# Patient Record
Sex: FEMALE | URBAN - METROPOLITAN AREA
[De-identification: names, ages, dates, MRNs, and addresses within clinical notes are randomized per-mention and may not be internally consistent; named-entity substitution may affect disease eponyms.]

---

## 2020-03-29 ENCOUNTER — EMERGENCY (EMERGENCY)
Facility: HOSPITAL | Age: 25
LOS: 1 days | Discharge: HOME | End: 2020-03-29
Attending: PEDIATRICS | Admitting: PEDIATRICS
Payer: COMMERCIAL

## 2020-03-29 VITALS
RESPIRATION RATE: 16 BRPM | OXYGEN SATURATION: 100 % | HEART RATE: 88 BPM | SYSTOLIC BLOOD PRESSURE: 109 MMHG | TEMPERATURE: 99 F | DIASTOLIC BLOOD PRESSURE: 78 MMHG

## 2020-03-29 VITALS
SYSTOLIC BLOOD PRESSURE: 119 MMHG | HEART RATE: 107 BPM | TEMPERATURE: 99 F | DIASTOLIC BLOOD PRESSURE: 91 MMHG | RESPIRATION RATE: 17 BRPM | OXYGEN SATURATION: 100 %

## 2020-03-29 LAB
ALBUMIN SERPL ELPH-MCNC: 5 G/DL — SIGNIFICANT CHANGE UP (ref 3.3–5)
ALP SERPL-CCNC: 46 U/L — SIGNIFICANT CHANGE UP (ref 40–120)
ALT FLD-CCNC: 15 U/L — SIGNIFICANT CHANGE UP (ref 4–33)
ANION GAP SERPL CALC-SCNC: 13 MMO/L — SIGNIFICANT CHANGE UP (ref 7–14)
AST SERPL-CCNC: 15 U/L — SIGNIFICANT CHANGE UP (ref 4–32)
BASOPHILS # BLD AUTO: 0.02 K/UL — SIGNIFICANT CHANGE UP (ref 0–0.2)
BASOPHILS NFR BLD AUTO: 0.4 % — SIGNIFICANT CHANGE UP (ref 0–2)
BILIRUB SERPL-MCNC: 0.3 MG/DL — SIGNIFICANT CHANGE UP (ref 0.2–1.2)
BUN SERPL-MCNC: 9 MG/DL — SIGNIFICANT CHANGE UP (ref 7–23)
CALCIUM SERPL-MCNC: 10.1 MG/DL — SIGNIFICANT CHANGE UP (ref 8.4–10.5)
CHLORIDE SERPL-SCNC: 103 MMOL/L — SIGNIFICANT CHANGE UP (ref 98–107)
CO2 SERPL-SCNC: 25 MMOL/L — SIGNIFICANT CHANGE UP (ref 22–31)
CREAT SERPL-MCNC: 0.71 MG/DL — SIGNIFICANT CHANGE UP (ref 0.5–1.3)
EOSINOPHIL # BLD AUTO: 0.01 K/UL — SIGNIFICANT CHANGE UP (ref 0–0.5)
EOSINOPHIL NFR BLD AUTO: 0.2 % — SIGNIFICANT CHANGE UP (ref 0–6)
GLUCOSE SERPL-MCNC: 92 MG/DL — SIGNIFICANT CHANGE UP (ref 70–99)
HCT VFR BLD CALC: 44.1 % — SIGNIFICANT CHANGE UP (ref 34.5–45)
HGB BLD-MCNC: 14.4 G/DL — SIGNIFICANT CHANGE UP (ref 11.5–15.5)
IMM GRANULOCYTES NFR BLD AUTO: 0.2 % — SIGNIFICANT CHANGE UP (ref 0–1.5)
LYMPHOCYTES # BLD AUTO: 0.81 K/UL — LOW (ref 1–3.3)
LYMPHOCYTES # BLD AUTO: 17.5 % — SIGNIFICANT CHANGE UP (ref 13–44)
MCHC RBC-ENTMCNC: 29.6 PG — SIGNIFICANT CHANGE UP (ref 27–34)
MCHC RBC-ENTMCNC: 32.7 % — SIGNIFICANT CHANGE UP (ref 32–36)
MCV RBC AUTO: 90.6 FL — SIGNIFICANT CHANGE UP (ref 80–100)
MONOCYTES # BLD AUTO: 0.27 K/UL — SIGNIFICANT CHANGE UP (ref 0–0.9)
MONOCYTES NFR BLD AUTO: 5.8 % — SIGNIFICANT CHANGE UP (ref 2–14)
NEUTROPHILS # BLD AUTO: 3.51 K/UL — SIGNIFICANT CHANGE UP (ref 1.8–7.4)
NEUTROPHILS NFR BLD AUTO: 75.9 % — SIGNIFICANT CHANGE UP (ref 43–77)
NRBC # FLD: 0 K/UL — SIGNIFICANT CHANGE UP (ref 0–0)
PLATELET # BLD AUTO: 235 K/UL — SIGNIFICANT CHANGE UP (ref 150–400)
PMV BLD: 11.5 FL — SIGNIFICANT CHANGE UP (ref 7–13)
POTASSIUM SERPL-MCNC: 4.5 MMOL/L — SIGNIFICANT CHANGE UP (ref 3.5–5.3)
POTASSIUM SERPL-SCNC: 4.5 MMOL/L — SIGNIFICANT CHANGE UP (ref 3.5–5.3)
PROT SERPL-MCNC: 8.2 G/DL — SIGNIFICANT CHANGE UP (ref 6–8.3)
RBC # BLD: 4.87 M/UL — SIGNIFICANT CHANGE UP (ref 3.8–5.2)
RBC # FLD: 12.1 % — SIGNIFICANT CHANGE UP (ref 10.3–14.5)
SODIUM SERPL-SCNC: 141 MMOL/L — SIGNIFICANT CHANGE UP (ref 135–145)
WBC # BLD: 4.63 K/UL — SIGNIFICANT CHANGE UP (ref 3.8–10.5)
WBC # FLD AUTO: 4.63 K/UL — SIGNIFICANT CHANGE UP (ref 3.8–10.5)

## 2020-03-29 PROCEDURE — 99284 EMERGENCY DEPT VISIT MOD MDM: CPT

## 2020-03-29 RX ORDER — SODIUM CHLORIDE 9 MG/ML
1000 INJECTION INTRAMUSCULAR; INTRAVENOUS; SUBCUTANEOUS ONCE
Refills: 0 | Status: COMPLETED | OUTPATIENT
Start: 2020-03-29 | End: 2020-03-29

## 2020-03-29 RX ADMIN — SODIUM CHLORIDE 2000 MILLILITER(S): 9 INJECTION INTRAMUSCULAR; INTRAVENOUS; SUBCUTANEOUS at 11:46

## 2020-03-29 NOTE — ED PROVIDER NOTE - OBJECTIVE STATEMENT
Ashtyn is a previously healthy 23yo female here with c/o of weakness, fevers x5 days, shortness of breath.  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  Says she has had daily fevers tmax 101-102F, responds to tylenol, generalized malaise, loss of appetitie. NO cough but short of breath on exertion, also had 1x diarrhea today  Wanted to go to  for evaluation but says she "felt too weak to get up".    NO complaints at this time.    NO other PMHx, PSHx, meds, allergies reported

## 2020-03-29 NOTE — ED ADULT TRIAGE NOTE - CHIEF COMPLAINT QUOTE
pt ambulatory to triage,  c/o 3 days of fever and weakness. was due to have covid swab today at [facility], states she was weak so wanted to be seen in the ED.

## 2020-03-29 NOTE — ED PEDIATRIC NURSE REASSESSMENT NOTE - NS ED NURSE REASSESS COMMENT FT2
Patient resting in bed, Denies pain or difficulty breathing, IV placed with fluids going. Covid19 testing consent form was signed and swab walked to lab. Currently waiting for lab results to return. VSS, will continue to monitor.

## 2020-03-29 NOTE — ED PEDIATRIC NURSE NOTE - NS ED NURSE DC INFO COMPLEXITY
Straightforward: Basic instructions, no meds, no home treatment/Patient asked questions/Verbalized Understanding/Simple: Patient demonstrates quick and easy understanding

## 2020-03-29 NOTE — ED PEDIATRIC NURSE NOTE - OBJECTIVE STATEMENT
24 y-o Female with no PMHX presents to the ED c/o fever and malaise x5 days. 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 Denies cough, vomting diarrhea. Pt also complains of SOB on exertion. Highest temp 101.8. Last taken Tylenol last night. 24 y-o Female with no PMHX presents to the ED c/o fever and malaise x5 days. 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 Pt states she had an appointment for COVD 19 testing at urgent care today but felt that she was to weak to go and asked her mother to drive to [facility]. Denies cough, vomiting/diarrhea. Pt also complains of SOB on exertion. Highest temp 101.8. Last taken Tylenol last night. 24 y-o Female with no PMHX presents to the ED c/o fever and malaise x5 days. 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 Pt states she had an appointment for COVD 19 testing at urgent care today but felt that she was to weak to go and asked her mother to drive to [facility]. Denies cough, vomiting/diarrhea. Pt also complains of dyspnea on exertion. Highest temp 101.8. Last taken Tylenol last night.

## 2020-03-29 NOTE — ED PROVIDER NOTE - CLINICAL SUMMARY MEDICAL DECISION MAKING FREE TEXT BOX
Elmo Helton DO (PEM Attending): Pt healthy, with fevers, malaise mild SOB. heere with normal vitals, no signs of shcok, normal saturations and RR, clear lungs. 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   Also suspect mild dehydration  -IV, fluids, COVID testing, reassess. If remains stable from respiratory standpoint, d/c

## 2020-03-29 NOTE — ED PROVIDER NOTE - NSFOLLOWUPINSTRUCTIONS_ED_ALL_ED_FT
Your CBC and CMP are normal and your vital signs were reassuring.  You should receive a text of your COVID results within a week.    Viral Illness  Viruses are tiny germs that can get into a person's body and cause illness. There are many different types of viruses, and they cause many types of illness. Viral illness in children is very common. A viral illness can cause fever, sore throat, cough, rash, or diarrhea. Most viral illnesses that affect children are not serious. Most go away after several days without treatment.    The most common types of viruses that affect children are:    Cold and flu viruses.  Stomach viruses.  Viruses that cause fever and rash. These include illnesses such as measles, rubella, roseola, fifth disease, and chicken pox.    What are the causes?  Many types of viruses can cause illness. Viruses invade cells in your child's body, multiply, and cause the infected cells to malfunction or die. When the cell dies, it releases more of the virus. When this happens, your child develops symptoms of the illness, and the virus continues to spread to other cells. If the virus takes over the function of the cell, it can cause the cell to divide and grow out of control, as is the case when a virus causes cancer.    Different viruses get into the body in different ways. Your child is most likely to catch a virus from being exposed to another person who is infected with a virus. This may happen at home, at school, or at . Your child may get a virus by:    Breathing in droplets that have been coughed or sneezed into the air by an infected person. Cold and flu viruses, as well as viruses that cause fever and rash, are often spread through these droplets.  Touching anything that has been contaminated with the virus and then touching his or her nose, mouth, or eyes. Objects can be contaminated with a virus if:    They have droplets on them from a recent cough or sneeze of an infected person.  They have been in contact with the vomit or stool (feces) of an infected person. Stomach viruses can spread through vomit or stool.    Eating or drinking anything that has been in contact with the virus.  Being bitten by an insect or animal that carries the virus.  Being exposed to blood or fluids that contain the virus, either through an open cut or during a transfusion.    What are the signs or symptoms?  Symptoms vary depending on the type of virus and the location of the cells that it invades. Common symptoms of the main types of viral illnesses that affect children include:    Cold and flu viruses     Fever.  Sore throat.  Aches and headache.  Stuffy nose.  Earache.  Cough.  Stomach viruses     Fever.  Loss of appetite.  Vomiting.  Stomachache.  Diarrhea.  Fever and rash viruses     Fever.  Swollen glands.  Rash.  Runny nose.  How is this treated?  Most viral illnesses in children go away within 3?10 days. In most cases, treatment is not needed. Your child's health care provider may suggest over-the-counter medicines to relieve symptoms.    A viral illness cannot be treated with antibiotic medicines. Viruses live inside cells, and antibiotics do not get inside cells. Instead, antiviral medicines are sometimes used to treat viral illness, but these medicines are rarely needed in children.    Many childhood viral illnesses can be prevented with vaccinations (immunization shots). These shots help prevent flu and many of the fever and rash viruses.    Follow these instructions at home:  Medicines     Give over-the-counter and prescription medicines only as told by your child's health care provider. Cold and flu medicines are usually not needed. If your child has a fever, ask the health care provider what over-the-counter medicine to use and what amount (dosage) to give.  Do not give your child aspirin because of the association with Reye syndrome.  If your child is older than 4 years and has a cough or sore throat, ask the health care provider if you can give cough drops or a throat lozenge.  Do not ask for an antibiotic prescription if your child has been diagnosed with a viral illness. That will not make your child's illness go away faster. Also, frequently taking antibiotics when they are not needed can lead to antibiotic resistance. When this develops, the medicine no longer works against the bacteria that it normally fights.  Eating and drinking     Image   If your child is vomiting, give only sips of clear fluids. Offer sips of fluid frequently. Follow instructions from your child's health care provider about eating or drinking restrictions.  If your child is able to drink fluids, have the child drink enough fluid to keep his or her urine clear or pale yellow.  General instructions     Make sure your child gets a lot of rest.  If your child has a stuffy nose, ask your child's health care provider if you can use salt-water nose drops or spray.  If your child has a cough, use a cool-mist humidifier in your child's room.  If your child is older than 1 year and has a cough, ask your child's health care provider if you can give teaspoons of honey and how often.  Keep your child home and rested until symptoms have cleared up. Let your child return to normal activities as told by your child's health care provider.  Keep all follow-up visits as told by your child's health care provider. This is important.  How is this prevented?  ImageTo reduce your child's risk of viral illness:    Teach your child to wash his or her hands often with soap and water. If soap and water are not available, he or she should use hand .  Teach your child to avoid touching his or her nose, eyes, and mouth, especially if the child has not washed his or her hands recently.  If anyone in the household has a viral infection, clean all household surfaces that may have been in contact with the virus. Use soap and hot water. You may also use diluted bleach.  Keep your child away from people who are sick with symptoms of a viral infection.  Teach your child to not share items such as toothbrushes and water bottles with other people.  Keep all of your child's immunizations up to date.  Have your child eat a healthy diet and get plenty of rest.    Contact a health care provider if:  Your child has symptoms of a viral illness for longer than expected. Ask your child's health care provider how long symptoms should last.  Treatment at home is not controlling your child's symptoms or they are getting worse.  Get help right away if:  Your child who is younger than 3 months has a temperature of 100°F (38°C) or higher.  Your child has vomiting that lasts more than 24 hours.  Your child has trouble breathing.  Your child has a severe headache or has a stiff neck.  This information is not intended to replace advice given to you by your health care provider. Make sure you discuss any questions you have with your health care provider.

## 2020-03-29 NOTE — ED PROVIDER NOTE - PATIENT PORTAL LINK FT
You can access the FollowMyHealth Patient Portal offered by [facility] by registering at the following website: [email or web address]. By joining [facility]’s FollowMyHealth portal, you will also be able to view your health information using other applications (apps) compatible with our system.

## 2020-03-29 NOTE — ED PEDIATRIC NURSE NOTE - CAS ELECT INFOMATION PROVIDED
Patient cleared for discharge as per MD. Signs and symptoms discussed for reasons to return. Parents comfortable with discharge plan. Vital signs as documented./DC instructions

## 2020-03-29 NOTE — ED PEDIATRIC TRIAGE NOTE - CHIEF COMPLAINT QUOTE
Patient here for fever and malaise x5days. Works at [employer], No PMHX, IUTD, no allergies. Patient here for fever and malaise x5days. Works at [employer], No PMHX, IUTD, no allergies.

## 2020-03-30 LAB — SARS-COV-2 RNA SPEC QL NAA+PROBE: DETECTED

## 2022-02-22 PROBLEM — Z78.9 OTHER SPECIFIED HEALTH STATUS: Chronic | Status: ACTIVE | Noted: 2020-03-29

## 2022-03-15 ENCOUNTER — APPOINTMENT (OUTPATIENT)
Dept: OBGYN | Facility: CLINIC | Age: 27
End: 2022-03-15

## 2022-04-01 PROBLEM — Z00.00 ENCOUNTER FOR PREVENTIVE HEALTH EXAMINATION: Status: ACTIVE | Noted: 2022-04-01

## 2022-04-11 ENCOUNTER — APPOINTMENT (OUTPATIENT)
Dept: OBGYN | Facility: CLINIC | Age: 27
End: 2022-04-11
Payer: COMMERCIAL

## 2022-04-11 VITALS
WEIGHT: 128 LBS | HEIGHT: 67 IN | DIASTOLIC BLOOD PRESSURE: 84 MMHG | SYSTOLIC BLOOD PRESSURE: 126 MMHG | BODY MASS INDEX: 20.09 KG/M2

## 2022-04-11 DIAGNOSIS — Z82.49 FAMILY HISTORY OF ISCHEMIC HEART DISEASE AND OTHER DISEASES OF THE CIRCULATORY SYSTEM: ICD-10-CM

## 2022-04-11 DIAGNOSIS — Z83.42 FAMILY HISTORY OF FAMILIAL HYPERCHOLESTEROLEMIA: ICD-10-CM

## 2022-04-11 DIAGNOSIS — Z01.419 ENCOUNTER FOR GYNECOLOGICAL EXAMINATION (GENERAL) (ROUTINE) W/OUT ABNORMAL FINDINGS: ICD-10-CM

## 2022-04-11 DIAGNOSIS — Z01.411 ENCOUNTER FOR GYNECOLOGICAL EXAMINATION (GENERAL) (ROUTINE) WITH ABNORMAL FINDINGS: ICD-10-CM

## 2022-04-11 DIAGNOSIS — N94.9 UNSPECIFIED CONDITION ASSOCIATED WITH FEMALE GENITAL ORGANS AND MENSTRUAL CYCLE: ICD-10-CM

## 2022-04-11 DIAGNOSIS — Z78.9 OTHER SPECIFIED HEALTH STATUS: ICD-10-CM

## 2022-04-11 PROCEDURE — 99385 PREV VISIT NEW AGE 18-39: CPT

## 2022-04-15 RX ORDER — CHROMIUM 200 MCG
TABLET ORAL
Refills: 0 | Status: ACTIVE | COMMUNITY

## 2022-04-28 LAB — CYTOLOGY CVX/VAG DOC THIN PREP: NORMAL

## 2023-10-28 ENCOUNTER — NON-APPOINTMENT (OUTPATIENT)
Age: 28
End: 2023-10-28

## 2023-11-15 ENCOUNTER — APPOINTMENT (OUTPATIENT)
Dept: INTERNAL MEDICINE | Facility: CLINIC | Age: 28
End: 2023-11-15

## 2023-12-06 ENCOUNTER — APPOINTMENT (OUTPATIENT)
Dept: INTERNAL MEDICINE | Facility: CLINIC | Age: 28
End: 2023-12-06

## 2024-07-24 ENCOUNTER — NON-APPOINTMENT (OUTPATIENT)
Age: 29
End: 2024-07-24

## 2025-03-10 ENCOUNTER — TRANSCRIPTION ENCOUNTER (OUTPATIENT)
Age: 30
End: 2025-03-10

## 2025-03-10 ENCOUNTER — APPOINTMENT (OUTPATIENT)
Dept: DERMATOLOGY | Facility: CLINIC | Age: 30
End: 2025-03-10
Payer: COMMERCIAL

## 2025-03-10 ENCOUNTER — LABORATORY RESULT (OUTPATIENT)
Age: 30
End: 2025-03-10

## 2025-03-10 VITALS — HEIGHT: 67 IN | WEIGHT: 125 LBS | BODY MASS INDEX: 19.62 KG/M2

## 2025-03-10 DIAGNOSIS — L30.9 DERMATITIS, UNSPECIFIED: ICD-10-CM

## 2025-03-10 DIAGNOSIS — D48.9 NEOPLASM OF UNCERTAIN BEHAVIOR, UNSPECIFIED: ICD-10-CM

## 2025-03-10 DIAGNOSIS — D48.5 NEOPLASM OF UNCERTAIN BEHAVIOR OF SKIN: ICD-10-CM

## 2025-03-10 PROCEDURE — 99204 OFFICE O/P NEW MOD 45 MIN: CPT | Mod: 25

## 2025-03-10 PROCEDURE — 11102 TANGNTL BX SKIN SINGLE LES: CPT

## 2025-03-10 PROCEDURE — 11103 TANGNTL BX SKIN EA SEP/ADDL: CPT

## 2025-03-10 RX ORDER — TRIAMCINOLONE ACETONIDE 1 MG/G
0.1 CREAM TOPICAL
Qty: 1 | Refills: 1 | Status: ACTIVE | COMMUNITY
Start: 2025-03-10 | End: 1900-01-01